# Patient Record
Sex: MALE | Race: WHITE | NOT HISPANIC OR LATINO | Employment: STUDENT | URBAN - NONMETROPOLITAN AREA
[De-identification: names, ages, dates, MRNs, and addresses within clinical notes are randomized per-mention and may not be internally consistent; named-entity substitution may affect disease eponyms.]

---

## 2020-02-06 DIAGNOSIS — M25.562 LEFT KNEE PAIN, UNSPECIFIED CHRONICITY: Primary | ICD-10-CM

## 2020-02-07 ENCOUNTER — OFFICE VISIT (OUTPATIENT)
Dept: ORTHOPEDIC SURGERY | Facility: CLINIC | Age: 20
End: 2020-02-07

## 2020-02-07 VITALS — BODY MASS INDEX: 24.9 KG/M2 | WEIGHT: 194 LBS | HEIGHT: 74 IN

## 2020-02-07 DIAGNOSIS — M22.2X1 PATELLOFEMORAL PAIN SYNDROME OF BOTH KNEES: ICD-10-CM

## 2020-02-07 DIAGNOSIS — M21.42 PES PLANUS OF BOTH FEET: ICD-10-CM

## 2020-02-07 DIAGNOSIS — M22.2X2 PATELLOFEMORAL PAIN SYNDROME OF BOTH KNEES: ICD-10-CM

## 2020-02-07 DIAGNOSIS — M25.561 CHRONIC PAIN OF BOTH KNEES: Primary | ICD-10-CM

## 2020-02-07 DIAGNOSIS — M25.562 CHRONIC PAIN OF BOTH KNEES: Primary | ICD-10-CM

## 2020-02-07 DIAGNOSIS — M21.41 PES PLANUS OF BOTH FEET: ICD-10-CM

## 2020-02-07 DIAGNOSIS — G89.29 CHRONIC PAIN OF BOTH KNEES: Primary | ICD-10-CM

## 2020-02-07 PROBLEM — M21.40 FLAT FOOT: Status: ACTIVE | Noted: 2020-02-07

## 2020-02-07 PROCEDURE — 99204 OFFICE O/P NEW MOD 45 MIN: CPT | Performed by: NURSE PRACTITIONER

## 2020-02-07 NOTE — PROGRESS NOTES
Burt Moses is a 19 y.o. male   Primary provider:  Liza Hung MD       Chief Complaint   Patient presents with   • Left Knee - Knee Pain       HISTORY OF PRESENT ILLNESS:    19-year-old male patient presents to office for evaluation of chronic bilateral knee pain with worse pain in the left knee.  Pain has been ongoing for approximately 4 years.  Patient complains of pain in the anterior aspect of his bilateral knees.  Patient denies any known, specific injury but states that he did previously play soccer and sustained various minor injuries.  No major injuries that required any specific recovery times, treatment or surgeries.  Patient has been evaluated in the past by Dr. Cason.  Pain is described as frequent (nearly constant) and moderate to severe.  Pain is described as stabbing and burning in nature with associated popping sensations and mild, intermittent swelling.  Pain is worse with sitting, weightbearing activity, walking and running.  Pain improves mildly with rest and Ibuprofen.    Knee Pain    The incident occurred more than 1 week ago (about 4 years ago). Injury mechanism: old soccer injury 4 years ago, no recent injury or incident. The pain is present in the left knee and right knee. The quality of the pain is described as stabbing and burning. The pain is severe. The pain has been constant since onset. Associated symptoms comments: Clicking, popping, swelling. . He reports no foreign bodies present. The symptoms are aggravated by weight bearing (sitting, walking, running). He has tried rest and NSAIDs for the symptoms. The treatment provided mild relief.        CONCURRENT MEDICAL HISTORY:    Past Medical History:   Diagnosis Date   • Asthma        No Known Allergies    No current outpatient medications on file.    History reviewed. No pertinent surgical history.    History reviewed. No pertinent family history.    Social History     Socioeconomic History   • Marital status:  "Single     Spouse name: Not on file   • Number of children: Not on file   • Years of education: Not on file   • Highest education level: Not on file   Tobacco Use   • Smoking status: Never Smoker   • Smokeless tobacco: Never Used   Substance and Sexual Activity   • Alcohol use: Never     Frequency: Never   • Drug use: Never   • Sexual activity: Defer        Review of Systems   Constitutional: Negative.    HENT: Negative.    Eyes: Negative.    Respiratory: Negative.    Cardiovascular: Negative.    Gastrointestinal: Negative.    Endocrine: Negative.    Genitourinary: Negative.    Musculoskeletal: Positive for arthralgias and joint swelling.        Right knee pain. Left knee pain.    Skin: Negative.    Allergic/Immunologic: Negative.    Neurological: Negative.    Hematological: Negative.    Psychiatric/Behavioral: Negative.        PHYSICAL EXAMINATION:       Ht 188 cm (74\")   Wt 88 kg (194 lb)   BMI 24.91 kg/m²     Physical Exam   Constitutional: He is oriented to person, place, and time. Vital signs are normal. He appears well-developed and well-nourished. He is active and cooperative. He does not appear ill. No distress.   HENT:   Head: Normocephalic.   Pulmonary/Chest: Effort normal. No respiratory distress.   Abdominal: Soft. He exhibits no distension.   Musculoskeletal: He exhibits tenderness (Mild, Right knee, Left knee). He exhibits no edema or deformity.        Right knee: He exhibits no effusion.        Left knee: He exhibits no effusion.   Neurological: He is alert and oriented to person, place, and time. GCS eye subscore is 4. GCS verbal subscore is 5. GCS motor subscore is 6.   Skin: Skin is warm, dry and intact. Capillary refill takes less than 2 seconds. No erythema.   Psychiatric: He has a normal mood and affect. His speech is normal and behavior is normal. Judgment and thought content normal. Cognition and memory are normal.   Vitals reviewed.      GAIT:     [x]  Normal  []  Antalgic    Assistive " device: [x]  None  []  Walker     []  Crutches  []  Cane     []  Wheelchair  []  Stretcher    Right Knee Exam     Muscle Strength   The patient has normal right knee strength.    Tenderness   The patient is experiencing tenderness in the patella and medial joint line (Mild).    Range of Motion   The patient has normal right knee ROM.    Tests   Brina:  Medial - negative Lateral - negative  Varus: negative Valgus: negative  Drawer:  Anterior - negative        Other   Erythema: absent  Sensation: normal  Pulse: present  Swelling: none  Effusion: no effusion present      Left Knee Exam     Muscle Strength   The patient has normal left knee strength.    Tenderness   The patient is experiencing tenderness in the patella and medial joint line (Mild).    Range of Motion   The patient has normal left knee ROM.    Tests   Brina:  Medial - negative Lateral - negative  Varus: negative Valgus: negative  Drawer:  Anterior - negative         Other   Erythema: absent  Sensation: normal  Pulse: present  Swelling: none  Effusion: no effusion present            Xr Knee 1 Or 2 View Left    Result Date: 2/7/2020  Narrative: Lateral view of the left knee reveals no evidence of acute fracture or dislocation.  No degenerative changes are seen at the patellofemoral joint.  Joint spacing is well-maintained.  The knee joint appears well-aligned.  No joint effusion is seen.  Soft tissues appear unremarkable.  No acute bony radiologic abnormalities are noted at this time.  No comparison images are available for review.02/07/20 at 4:40 PM by PARUL Nuñez     Xr Knee Bilateral Ap Standing    Result Date: 2/7/2020  Narrative: AP standing view of bilateral knees reveals no evidence of acute fracture or dislocation.  No significant degenerative changes are noted.  Joint spaces are well-maintained.  The knee joints appear well-aligned.  The bones appear well mineralized.  Soft tissues appear unremarkable.  No acute bony radiologic  abnormalities are noted at this time.  No comparison images are available for review.02/07/20 at 4:39 PM by PARUL Nuñez         ASSESSMENT:    Diagnoses and all orders for this visit:    Chronic pain of both knees    Pes planus of both feet  -     Ambulatory Referral to Physical Therapy    Patellofemoral pain syndrome of both knees    PLAN    X-rays of the bilateral knees performed in office today and reviewed with no acute findings noted.  No degenerative changes are noted.  Patient complains of pain is been ongoing for approximately 4 years and occurs with activity in the anterior aspect of his knees and at the patellas.  He does report remote injuries to his knees while he was playing soccer several years ago.  No instability symptoms.  No mechanical symptoms.  Patient does have flatfootedness.  We discussed a trial of custom orthotics to see if this improves his anterior knee pain.  Patient likely has patellofemoral pain syndrome.  Recommend rest and activity modification during times of increased pain.  Recommend to continue with Tylenol or Ibuprofen as needed for pain.  Recommend elevation and ice therapy as needed to minimize pain/swelling/inflammation.  We discussed ordering MRI of the left knee if his pain persists to evaluate for chondromalacia, meniscal tear, ligament tear, etc.  Patient wants to wait on this for now and try the custom orthotics.  Patient is a student at [employer] and will be leaving to go back to school [employer].  He states he may have to schedule the appointment for his orthotic fitting when he is back in town again.  Follow-up in 4 weeks for recheck as needed for any new, worsening or persistent symptoms.    Return in about 4 weeks (around 3/6/2020), or if symptoms worsen or fail to improve, for Recheck.        This document has been electronically signed by PARUL Nuñez on February 11, 2020 7:53 PM      PARUL Nuñez

## 2020-02-11 PROBLEM — M22.2X1 PATELLOFEMORAL PAIN SYNDROME OF BOTH KNEES: Status: ACTIVE | Noted: 2020-02-11

## 2020-02-11 PROBLEM — M22.2X2 PATELLOFEMORAL PAIN SYNDROME OF BOTH KNEES: Status: ACTIVE | Noted: 2020-02-11

## 2020-02-14 ENCOUNTER — APPOINTMENT (OUTPATIENT)
Dept: PHYSICAL THERAPY | Facility: HOSPITAL | Age: 20
End: 2020-02-14

## 2023-08-30 RX ORDER — TRIAMCINOLONE ACETONIDE 0.1 %
PASTE (GRAM) DENTAL 2 TIMES DAILY
Qty: 5 G | Refills: 5 | Status: SHIPPED | OUTPATIENT
Start: 2023-08-30